# Patient Record
Sex: FEMALE | Race: WHITE | NOT HISPANIC OR LATINO | URBAN - METROPOLITAN AREA
[De-identification: names, ages, dates, MRNs, and addresses within clinical notes are randomized per-mention and may not be internally consistent; named-entity substitution may affect disease eponyms.]

---

## 2017-05-17 ENCOUNTER — TRANSCRIBE ORDERS (OUTPATIENT)
Dept: ADMINISTRATIVE | Facility: HOSPITAL | Age: 42
End: 2017-05-17

## 2017-05-17 DIAGNOSIS — Z12.31 VISIT FOR SCREENING MAMMOGRAM: Primary | ICD-10-CM

## 2017-05-24 ENCOUNTER — TRANSCRIBE ORDERS (OUTPATIENT)
Dept: LAB | Facility: HOSPITAL | Age: 42
End: 2017-05-24

## 2017-05-24 ENCOUNTER — APPOINTMENT (OUTPATIENT)
Dept: LAB | Facility: HOSPITAL | Age: 42
End: 2017-05-24

## 2017-05-24 ENCOUNTER — HOSPITAL ENCOUNTER (OUTPATIENT)
Dept: MAMMOGRAPHY | Facility: HOSPITAL | Age: 42
Discharge: HOME | End: 2017-05-24
Attending: OBSTETRICS & GYNECOLOGY | Admitting: OBSTETRICS & GYNECOLOGY

## 2017-05-24 DIAGNOSIS — Z12.31 VISIT FOR SCREENING MAMMOGRAM: ICD-10-CM

## 2017-05-24 DIAGNOSIS — N95.1 SYMPTOMATIC MENOPAUSAL OR FEMALE CLIMACTERIC STATES: Primary | ICD-10-CM

## 2017-05-24 DIAGNOSIS — R53.83 OTHER FATIGUE: ICD-10-CM

## 2017-05-24 LAB
25(OH)D3 SERPL-MCNC: 47.9 NG/ML
ESTRADIOL SERPL HS-MCNC: 86 PG/ML
T3RU NFR SERPL: 31.2 % (ref 23–37)
T4 SERPL-MCNC: 5.8 MCG/DL (ref 4.7–11.4)
TSH SERPL DL<=0.05 MIU/L-ACNC: 0.83 MIU/ML (ref 0.35–5.35)

## 2017-05-24 PROCEDURE — 84402 ASSAY OF FREE TESTOSTERONE: CPT | Performed by: OBSTETRICS & GYNECOLOGY

## 2017-05-24 PROCEDURE — 84436 ASSAY OF TOTAL THYROXINE: CPT | Performed by: OBSTETRICS & GYNECOLOGY

## 2017-05-24 PROCEDURE — 77063 BREAST TOMOSYNTHESIS BI: CPT | Performed by: RADIOLOGY

## 2017-05-24 PROCEDURE — 82306 VITAMIN D 25 HYDROXY: CPT | Performed by: OBSTETRICS & GYNECOLOGY

## 2017-05-24 PROCEDURE — 84479 ASSAY OF THYROID (T3 OR T4): CPT | Performed by: OBSTETRICS & GYNECOLOGY

## 2017-05-24 PROCEDURE — 84443 ASSAY THYROID STIM HORMONE: CPT | Performed by: OBSTETRICS & GYNECOLOGY

## 2017-05-24 PROCEDURE — 36415 COLL VENOUS BLD VENIPUNCTURE: CPT | Performed by: OBSTETRICS & GYNECOLOGY

## 2017-05-24 PROCEDURE — G0202 SCR MAMMO BI INCL CAD: HCPCS

## 2017-05-24 PROCEDURE — 77067 SCR MAMMO BI INCL CAD: CPT | Performed by: RADIOLOGY

## 2017-05-24 PROCEDURE — 77063 BREAST TOMOSYNTHESIS BI: CPT

## 2017-05-24 PROCEDURE — 82670 ASSAY OF TOTAL ESTRADIOL: CPT | Performed by: OBSTETRICS & GYNECOLOGY

## 2017-05-26 LAB — TESTOST FREE SERPL-MCNC: 2.9 PG/ML (ref 0–4.2)

## 2017-08-16 ENCOUNTER — TRANSCRIBE ORDERS (OUTPATIENT)
Dept: LAB | Facility: HOSPITAL | Age: 42
End: 2017-08-16

## 2017-08-16 ENCOUNTER — APPOINTMENT (OUTPATIENT)
Dept: LAB | Facility: HOSPITAL | Age: 42
End: 2017-08-16

## 2017-08-16 DIAGNOSIS — N95.1 MENOPAUSAL STATE: Primary | ICD-10-CM

## 2017-08-16 LAB
25(OH)D3 SERPL-MCNC: 47.7 NG/ML
ESTRADIOL SERPL HS-MCNC: 78 PG/ML

## 2017-08-16 PROCEDURE — 82670 ASSAY OF TOTAL ESTRADIOL: CPT | Performed by: OBSTETRICS & GYNECOLOGY

## 2017-08-16 PROCEDURE — 82306 VITAMIN D 25 HYDROXY: CPT | Performed by: OBSTETRICS & GYNECOLOGY

## 2017-08-16 PROCEDURE — 84402 ASSAY OF FREE TESTOSTERONE: CPT | Performed by: OBSTETRICS & GYNECOLOGY

## 2017-08-16 PROCEDURE — 36415 COLL VENOUS BLD VENIPUNCTURE: CPT | Performed by: OBSTETRICS & GYNECOLOGY

## 2017-08-18 LAB — TESTOST FREE SERPL-MCNC: 1.3 PG/ML (ref 0–4.2)

## 2017-10-25 ENCOUNTER — APPOINTMENT (OUTPATIENT)
Dept: LAB | Facility: HOSPITAL | Age: 42
End: 2017-10-25

## 2017-10-25 ENCOUNTER — TRANSCRIBE ORDERS (OUTPATIENT)
Dept: LAB | Facility: HOSPITAL | Age: 42
End: 2017-10-25

## 2017-10-25 DIAGNOSIS — N95.1 MENOPAUSAL SYNDROME: Primary | ICD-10-CM

## 2017-10-25 LAB — ESTRADIOL SERPL HS-MCNC: 109 PG/ML

## 2017-10-25 PROCEDURE — 84402 ASSAY OF FREE TESTOSTERONE: CPT | Performed by: OBSTETRICS & GYNECOLOGY

## 2017-10-25 PROCEDURE — 82670 ASSAY OF TOTAL ESTRADIOL: CPT | Performed by: OBSTETRICS & GYNECOLOGY

## 2017-10-25 PROCEDURE — 36415 COLL VENOUS BLD VENIPUNCTURE: CPT | Performed by: OBSTETRICS & GYNECOLOGY

## 2017-10-26 LAB — TESTOST FREE SERPL-MCNC: 1.1 PG/ML (ref 0–4.2)

## 2018-08-15 ENCOUNTER — TRANSCRIBE ORDERS (OUTPATIENT)
Dept: ADMINISTRATIVE | Facility: HOSPITAL | Age: 43
End: 2018-08-15

## 2018-08-15 DIAGNOSIS — Z12.31 VISIT FOR SCREENING MAMMOGRAM: Primary | ICD-10-CM

## 2018-09-19 ENCOUNTER — APPOINTMENT (OUTPATIENT)
Dept: MAMMOGRAPHY | Facility: HOSPITAL | Age: 43
End: 2018-09-19
Attending: OBSTETRICS & GYNECOLOGY

## 2020-07-06 ENCOUNTER — HOSPITAL ENCOUNTER (EMERGENCY)
Facility: HOSPITAL | Age: 45
Discharge: HOME | End: 2020-07-06
Attending: EMERGENCY MEDICINE | Admitting: EMERGENCY MEDICINE

## 2020-07-06 ENCOUNTER — APPOINTMENT (OUTPATIENT)
Dept: CT IMAGING | Facility: HOSPITAL | Age: 45
End: 2020-07-06

## 2020-07-06 VITALS
BODY MASS INDEX: 29.23 KG/M2 | RESPIRATION RATE: 16 BRPM | OXYGEN SATURATION: 98 % | HEIGHT: 59 IN | WEIGHT: 145 LBS | HEART RATE: 51 BPM | TEMPERATURE: 97.7 F | DIASTOLIC BLOOD PRESSURE: 69 MMHG | SYSTOLIC BLOOD PRESSURE: 120 MMHG

## 2020-07-06 DIAGNOSIS — K29.70 ESOPHAGITIS WITH GASTRITIS: Primary | ICD-10-CM

## 2020-07-06 DIAGNOSIS — K20.90 ESOPHAGITIS WITH GASTRITIS: Primary | ICD-10-CM

## 2020-07-06 LAB
ALBUMIN SERPL-MCNC: 4.12 G/DL (ref 3.5–5.2)
ALBUMIN/GLOB SERPL: 1.4 G/DL
ALP SERPL-CCNC: 53 U/L (ref 39–117)
ALT SERPL W P-5'-P-CCNC: 19 U/L (ref 1–33)
AMYLASE SERPL-CCNC: 67 U/L (ref 28–100)
ANION GAP SERPL CALCULATED.3IONS-SCNC: 12.4 MMOL/L (ref 5–15)
AST SERPL-CCNC: 20 U/L (ref 1–32)
BASOPHILS # BLD AUTO: 0.04 10*3/MM3 (ref 0–0.2)
BASOPHILS NFR BLD AUTO: 0.4 % (ref 0–1.5)
BILIRUB SERPL-MCNC: 0.4 MG/DL (ref 0.2–1.2)
BUN SERPL-MCNC: 14 MG/DL (ref 6–20)
BUN/CREAT SERPL: 17.7 (ref 7–25)
CALCIUM SPEC-SCNC: 8.9 MG/DL (ref 8.6–10.5)
CHLORIDE SERPL-SCNC: 104 MMOL/L (ref 98–107)
CO2 SERPL-SCNC: 23.6 MMOL/L (ref 22–29)
CREAT SERPL-MCNC: 0.79 MG/DL (ref 0.57–1)
DEPRECATED RDW RBC AUTO: 45.6 FL (ref 37–54)
EOSINOPHIL # BLD AUTO: 0.22 10*3/MM3 (ref 0–0.4)
EOSINOPHIL NFR BLD AUTO: 2 % (ref 0.3–6.2)
ERYTHROCYTE [DISTWIDTH] IN BLOOD BY AUTOMATED COUNT: 13.1 % (ref 12.3–15.4)
GFR SERPL CREATININE-BSD FRML MDRD: 79 ML/MIN/1.73
GLOBULIN UR ELPH-MCNC: 3 GM/DL
GLUCOSE SERPL-MCNC: 111 MG/DL (ref 65–99)
HCT VFR BLD AUTO: 38.6 % (ref 34–46.6)
HGB BLD-MCNC: 12 G/DL (ref 12–15.9)
IMM GRANULOCYTES # BLD AUTO: 0.02 10*3/MM3 (ref 0–0.05)
IMM GRANULOCYTES NFR BLD AUTO: 0.2 % (ref 0–0.5)
LIPASE SERPL-CCNC: 32 U/L (ref 13–60)
LYMPHOCYTES # BLD AUTO: 1.86 10*3/MM3 (ref 0.7–3.1)
LYMPHOCYTES NFR BLD AUTO: 16.9 % (ref 19.6–45.3)
MCH RBC QN AUTO: 29.5 PG (ref 26.6–33)
MCHC RBC AUTO-ENTMCNC: 31.1 G/DL (ref 31.5–35.7)
MCV RBC AUTO: 94.8 FL (ref 79–97)
MONOCYTES # BLD AUTO: 0.65 10*3/MM3 (ref 0.1–0.9)
MONOCYTES NFR BLD AUTO: 5.9 % (ref 5–12)
NEUTROPHILS NFR BLD AUTO: 74.6 % (ref 42.7–76)
NEUTROPHILS NFR BLD AUTO: 8.2 10*3/MM3 (ref 1.7–7)
NRBC BLD AUTO-RTO: 0 /100 WBC (ref 0–0.2)
PLATELET # BLD AUTO: 282 10*3/MM3 (ref 140–450)
PMV BLD AUTO: 10.3 FL (ref 6–12)
POTASSIUM SERPL-SCNC: 4.5 MMOL/L (ref 3.5–5.2)
PROT SERPL-MCNC: 7.1 G/DL (ref 6–8.5)
RBC # BLD AUTO: 4.07 10*6/MM3 (ref 3.77–5.28)
SODIUM SERPL-SCNC: 140 MMOL/L (ref 136–145)
TROPONIN T SERPL-MCNC: <0.01 NG/ML (ref 0–0.03)
WBC # BLD AUTO: 10.99 10*3/MM3 (ref 3.4–10.8)

## 2020-07-06 PROCEDURE — 96374 THER/PROPH/DIAG INJ IV PUSH: CPT

## 2020-07-06 PROCEDURE — 85025 COMPLETE CBC W/AUTO DIFF WBC: CPT | Performed by: EMERGENCY MEDICINE

## 2020-07-06 PROCEDURE — 99284 EMERGENCY DEPT VISIT MOD MDM: CPT

## 2020-07-06 PROCEDURE — 25010000002 MORPHINE PER 10 MG: Performed by: EMERGENCY MEDICINE

## 2020-07-06 PROCEDURE — 82150 ASSAY OF AMYLASE: CPT | Performed by: EMERGENCY MEDICINE

## 2020-07-06 PROCEDURE — 74176 CT ABD & PELVIS W/O CONTRAST: CPT

## 2020-07-06 PROCEDURE — 83690 ASSAY OF LIPASE: CPT | Performed by: EMERGENCY MEDICINE

## 2020-07-06 PROCEDURE — 93010 ELECTROCARDIOGRAM REPORT: CPT | Performed by: SPECIALIST

## 2020-07-06 PROCEDURE — 80053 COMPREHEN METABOLIC PANEL: CPT | Performed by: EMERGENCY MEDICINE

## 2020-07-06 PROCEDURE — 93005 ELECTROCARDIOGRAM TRACING: CPT | Performed by: EMERGENCY MEDICINE

## 2020-07-06 PROCEDURE — 96375 TX/PRO/DX INJ NEW DRUG ADDON: CPT

## 2020-07-06 PROCEDURE — 84484 ASSAY OF TROPONIN QUANT: CPT | Performed by: EMERGENCY MEDICINE

## 2020-07-06 PROCEDURE — 25010000002 ONDANSETRON PER 1 MG: Performed by: EMERGENCY MEDICINE

## 2020-07-06 RX ORDER — PANTOPRAZOLE SODIUM 40 MG/1
40 TABLET, DELAYED RELEASE ORAL DAILY
Qty: 30 TABLET | Refills: 0 | Status: SHIPPED | OUTPATIENT
Start: 2020-07-06

## 2020-07-06 RX ORDER — SUCRALFATE ORAL 1 G/10ML
1 SUSPENSION ORAL
Qty: 280 ML | Refills: 0 | Status: SHIPPED | OUTPATIENT
Start: 2020-07-06 | End: 2020-07-13

## 2020-07-06 RX ORDER — LIDOCAINE HYDROCHLORIDE 20 MG/ML
15 SOLUTION OROPHARYNGEAL ONCE
Status: COMPLETED | OUTPATIENT
Start: 2020-07-06 | End: 2020-07-06

## 2020-07-06 RX ORDER — ALUMINA, MAGNESIA, AND SIMETHICONE 2400; 2400; 240 MG/30ML; MG/30ML; MG/30ML
15 SUSPENSION ORAL ONCE
Status: COMPLETED | OUTPATIENT
Start: 2020-07-06 | End: 2020-07-06

## 2020-07-06 RX ORDER — ONDANSETRON 2 MG/ML
4 INJECTION INTRAMUSCULAR; INTRAVENOUS ONCE
Status: COMPLETED | OUTPATIENT
Start: 2020-07-06 | End: 2020-07-06

## 2020-07-06 RX ORDER — SODIUM CHLORIDE 0.9 % (FLUSH) 0.9 %
10 SYRINGE (ML) INJECTION AS NEEDED
Status: DISCONTINUED | OUTPATIENT
Start: 2020-07-06 | End: 2020-07-06 | Stop reason: HOSPADM

## 2020-07-06 RX ORDER — PHENOBARBITAL, HYOSCYAMINE SULFATE, ATROPINE SULFATE AND SCOPOLAMINE HYDROBROMIDE .0194; .1037; 16.2; .0065 MG/1; MG/1; MG/1; MG/1
2 TABLET ORAL ONCE
Status: COMPLETED | OUTPATIENT
Start: 2020-07-06 | End: 2020-07-06

## 2020-07-06 RX ADMIN — ONDANSETRON 4 MG: 2 INJECTION INTRAMUSCULAR; INTRAVENOUS at 04:47

## 2020-07-06 RX ADMIN — SODIUM CHLORIDE 1000 ML: 9 INJECTION, SOLUTION INTRAVENOUS at 04:47

## 2020-07-06 RX ADMIN — LIDOCAINE HYDROCHLORIDE 15 ML: 20 SOLUTION ORAL; TOPICAL at 06:27

## 2020-07-06 RX ADMIN — MORPHINE SULFATE 4 MG: 4 INJECTION, SOLUTION INTRAMUSCULAR; INTRAVENOUS at 04:47

## 2020-07-06 RX ADMIN — PHENOBARBITAL, HYOSCYAMINE SULFATE, ATROPINE SULFATE, SCOPOLAMINE HYDROBROMIDE 32.4 MG: 16.2; .1037; .0194; .0065 TABLET ORAL at 06:27

## 2020-07-06 RX ADMIN — ALUMINUM HYDROXIDE, MAGNESIUM HYDROXIDE, AND DIMETHICONE 15 ML: 400; 400; 40 SUSPENSION ORAL at 06:26

## 2020-07-06 NOTE — ED NOTES
Pt resting quietly on stretcher with complaints of epigastric tenderness.  Pt AAOx4 with no resp distress noted, respirations even and unlabored.  Pt denies any needs at this time.  Skin PWD.  Pt family at bedside. Will continue to monitor and follow plan of care.  Bed rails up x2, bed in lowest position, call light in reach.           Remedios Rubio, RN  07/06/20 0503

## 2020-07-08 NOTE — ED PROVIDER NOTES
Subjective   45-year-old female in the emergency department with nausea and vomiting.  Patient also reports epigastric pain.  No significant past medical history.      History provided by:  Patient   used: No        Review of Systems   Constitutional: Negative for activity change, appetite change, chills, diaphoresis, fatigue and fever.   HENT: Negative for congestion, ear pain and sore throat.    Eyes: Negative for redness.   Respiratory: Negative for cough, chest tightness, shortness of breath and wheezing.    Cardiovascular: Negative for chest pain, palpitations and leg swelling.   Gastrointestinal: Positive for abdominal pain, nausea and vomiting. Negative for diarrhea.   Genitourinary: Negative for dysuria and urgency.   Musculoskeletal: Negative for arthralgias, back pain, myalgias and neck pain.   Skin: Negative for pallor, rash and wound.   Neurological: Negative for dizziness, speech difficulty, weakness and headaches.   Psychiatric/Behavioral: Negative for agitation, behavioral problems, confusion and decreased concentration.   All other systems reviewed and are negative.      History reviewed. No pertinent past medical history.    Allergies   Allergen Reactions   • Codeine Nausea And Vomiting       Past Surgical History:   Procedure Laterality Date   • HYSTERECTOMY  2004    Total w/BSO   • OOPHORECTOMY Bilateral 2004       Family History   Problem Relation Age of Onset   • Breast cancer Neg Hx    • Ovarian cancer Neg Hx        Social History     Socioeconomic History   • Marital status:      Spouse name: Not on file   • Number of children: Not on file   • Years of education: Not on file   • Highest education level: Not on file           Objective   Physical Exam   Constitutional: She is oriented to person, place, and time. She appears well-developed and well-nourished.  Non-toxic appearance. No distress.   HENT:   Head: Normocephalic and atraumatic.   Right Ear: External ear  normal.   Left Ear: External ear normal.   Nose: Nose normal.   Mouth/Throat: Oropharynx is clear and moist and mucous membranes are normal. No oropharyngeal exudate. No tonsillar exudate.   Eyes: Pupils are equal, round, and reactive to light. Conjunctivae, EOM and lids are normal.   Neck: Normal range of motion and full passive range of motion without pain. Neck supple. No thyromegaly present.   Cardiovascular: Normal rate, regular rhythm, S1 normal, S2 normal, normal heart sounds, intact distal pulses and normal pulses.   Pulmonary/Chest: Effort normal and breath sounds normal. No tachypnea. No respiratory distress. She has no decreased breath sounds. She has no wheezes. She has no rales. She exhibits no tenderness.   Abdominal: Soft. Normal appearance and bowel sounds are normal. She exhibits no distension. There is tenderness in the epigastric area. There is no rebound and no guarding.   Musculoskeletal: Normal range of motion. She exhibits no edema, tenderness or deformity.   Lymphadenopathy:     She has no cervical adenopathy.   Neurological: She is alert and oriented to person, place, and time. She has normal strength. No cranial nerve deficit or sensory deficit. GCS eye subscore is 4. GCS verbal subscore is 5. GCS motor subscore is 6.   Skin: Skin is warm, dry and intact. No rash noted. She is not diaphoretic. No erythema. No pallor.   Psychiatric: She has a normal mood and affect. Her speech is normal and behavior is normal. Judgment and thought content normal. Cognition and memory are normal.   Nursing note and vitals reviewed.      Procedures           ED Course  ED Course as of Jul 08 0649   Mon Jul 06, 2020   0558 IMPRESSION:  Negative CT of the abdomen and pelvis.   CT Abdomen Pelvis Without Contrast [ES]   Wed Jul 08, 2020   0647 Vent. Rate : 055 BPM     Atrial Rate : 055 BPM     P-R Int : 138 ms          QRS Dur : 082 ms      QT Int : 452 ms       P-R-T Axes : 038 057 032 degrees     QTc Int : 432  ms     Sinus bradycardia with sinus arrhythmia  Otherwise normal ECG  No previous ECGs available   ECG 12 Lead [ES]      ED Course User Index  [ES] Yinka Kelly MD                                           MDM  Number of Diagnoses or Management Options  Esophagitis with gastritis: new and requires workup     Amount and/or Complexity of Data Reviewed  Clinical lab tests: reviewed and ordered  Tests in the radiology section of CPT®: reviewed and ordered  Tests in the medicine section of CPT®: reviewed and ordered  Review and summarize past medical records: yes  Independent visualization of images, tracings, or specimens: yes    Risk of Complications, Morbidity, and/or Mortality  Presenting problems: moderate  Diagnostic procedures: moderate  Management options: moderate    Patient Progress  Patient progress: stable      Final diagnoses:   Esophagitis with gastritis            Yinka Kelly MD  07/08/20 0682

## 2021-03-23 ENCOUNTER — BULK ORDERING (OUTPATIENT)
Dept: CASE MANAGEMENT | Facility: OTHER | Age: 46
End: 2021-03-23

## 2021-03-23 DIAGNOSIS — Z23 IMMUNIZATION DUE: ICD-10-CM

## 2022-05-06 ENCOUNTER — PRE-ADMISSION TESTING (OUTPATIENT)
Dept: PREADMISSION TESTING | Facility: HOSPITAL | Age: 47
End: 2022-05-06

## 2022-05-06 VITALS — HEIGHT: 59 IN | BODY MASS INDEX: 32.98 KG/M2 | WEIGHT: 163.58 LBS

## 2022-05-06 LAB
DEPRECATED RDW RBC AUTO: 44.2 FL (ref 37–54)
ERYTHROCYTE [DISTWIDTH] IN BLOOD BY AUTOMATED COUNT: 12.8 % (ref 12.3–15.4)
HBA1C MFR BLD: 5 % (ref 4.8–5.6)
HCT VFR BLD AUTO: 36.2 % (ref 34–46.6)
HGB BLD-MCNC: 11.9 G/DL (ref 12–15.9)
MCH RBC QN AUTO: 30.9 PG (ref 26.6–33)
MCHC RBC AUTO-ENTMCNC: 32.9 G/DL (ref 31.5–35.7)
MCV RBC AUTO: 94 FL (ref 79–97)
PLATELET # BLD AUTO: 333 10*3/MM3 (ref 140–450)
PMV BLD AUTO: 10.2 FL (ref 6–12)
RBC # BLD AUTO: 3.85 10*6/MM3 (ref 3.77–5.28)
SARS-COV-2 RNA PNL SPEC NAA+PROBE: NOT DETECTED
WBC NRBC COR # BLD: 9.02 10*3/MM3 (ref 3.4–10.8)

## 2022-05-06 PROCEDURE — 36415 COLL VENOUS BLD VENIPUNCTURE: CPT

## 2022-05-06 PROCEDURE — U0004 COV-19 TEST NON-CDC HGH THRU: HCPCS

## 2022-05-06 PROCEDURE — C9803 HOPD COVID-19 SPEC COLLECT: HCPCS

## 2022-05-06 PROCEDURE — 83036 HEMOGLOBIN GLYCOSYLATED A1C: CPT

## 2022-05-06 PROCEDURE — 85027 COMPLETE CBC AUTOMATED: CPT

## 2022-05-06 NOTE — PAT
Patient viewed general PAT education video as instructed in their preoperative information received from their surgeon.  Patient stated the general PAT education video was viewed in its entirety and survey completed.  Copies of [facility] general education handouts (Incentive Spirometry, Meds to Beds Program, Patient Belongings, Pre-op skin preparation instructions, Blood Glucose testing, Visitor policy, Surgery FAQ, Code H) distributed to patient if not printed. Education related to the PAT pass and skin preparation for surgery (if applicable) completed in PAT as a reinforcement to PAT education video. Patient instructed to return PAT pass provided today as well as completed skin preparation sheet (if applicable) on the day of procedure.     Additionally if patient had not viewed video yet but intended to view it at home or in our waiting area, then referred them to the handout with QR code/link provided during PAT visit.  Instructed patient to complete survey after viewing the video in its entirety.  Encouraged patient/family to read [facility] general education handouts thoroughly and notify PAT staff with any questions or concerns. Patient verbalized understanding of all information and priority content.    Patient to apply Chlorhexadine wipes  to surgical area (as instructed) the night before procedure and the AM of procedure. Wipes provided.    Patient denies any current skin issues.     COVID TEST PERFORMED IN PAT TODAY    An arrival time for procedure was not given during PAT visit. If patient had any questions or concerns about their arrival time, they were instructed to contact their surgeon/physician.  Additionally, if the patient referred to an arrival time that was acquired from their my chart account, patient was encouraged to verify that time with their surgeon/physician.  NO arrival times given in Pre Admission Testing Department.

## 2022-05-08 ENCOUNTER — ANESTHESIA EVENT (OUTPATIENT)
Dept: PERIOP | Facility: HOSPITAL | Age: 47
End: 2022-05-08

## 2022-05-08 NOTE — ANESTHESIA PREPROCEDURE EVALUATION
Anesthesia Evaluation     Patient summary reviewed and Nursing notes reviewed   history of anesthetic complications: PONV  NPO Solid Status: > 8 hours  NPO Liquid Status: > 8 hours           Airway   Mallampati: II  TM distance: >3 FB  Neck ROM: full  No difficulty expected  Dental - normal exam     Pulmonary - negative pulmonary ROS and normal exam   Cardiovascular - normal exam    ECG reviewed    (+) valvular problems/murmurs MVP,       Neuro/Psych  (+) headaches,    (-) seizures, TIA  GI/Hepatic/Renal/Endo    (+)  GERD well controlled,    (-) liver disease, no renal disease, diabetes, no thyroid disorder    Musculoskeletal     Abdominal    Substance History      OB/GYN          Other - negative ROS                     Anesthesia Plan    ASA 3     general   (Scop patch  Low dose propofol gtt for anti-nausea effect  TAPS)  intravenous induction     Anesthetic plan, all risks, benefits, and alternatives have been provided, discussed and informed consent has been obtained with: patient.    Plan discussed with CRNA.        CODE STATUS:

## 2022-05-09 ENCOUNTER — ANESTHESIA EVENT CONVERTED (OUTPATIENT)
Dept: ANESTHESIOLOGY | Facility: HOSPITAL | Age: 47
End: 2022-05-09

## 2022-05-09 ENCOUNTER — ANESTHESIA (OUTPATIENT)
Dept: PERIOP | Facility: HOSPITAL | Age: 47
End: 2022-05-09

## 2022-05-09 ENCOUNTER — HOSPITAL ENCOUNTER (OUTPATIENT)
Facility: HOSPITAL | Age: 47
Discharge: HOME | End: 2022-05-10
Attending: PLASTIC SURGERY | Admitting: PLASTIC SURGERY

## 2022-05-09 DIAGNOSIS — N64.81 BREAST PTOSIS: ICD-10-CM

## 2022-05-09 PROBLEM — L98.7 EXCESS SKIN: Status: ACTIVE | Noted: 2022-05-09

## 2022-05-09 PROCEDURE — 25010000002 HYDROMORPHONE 1 MG/ML SOLUTION

## 2022-05-09 PROCEDURE — 25010000002 MORPHINE PER 10 MG: Performed by: PLASTIC SURGERY

## 2022-05-09 PROCEDURE — G0378 HOSPITAL OBSERVATION PER HR: HCPCS

## 2022-05-09 PROCEDURE — 25010000002 EPINEPHRINE PER 0.1 MG: Performed by: PLASTIC SURGERY

## 2022-05-09 PROCEDURE — 25010000002 CEFAZOLIN IN DEXTROSE 2-4 GM/100ML-% SOLUTION: Performed by: PLASTIC SURGERY

## 2022-05-09 PROCEDURE — 25010000002 FENTANYL CITRATE (PF) 50 MCG/ML SOLUTION

## 2022-05-09 PROCEDURE — 25010000002 ONDANSETRON PER 1 MG

## 2022-05-09 PROCEDURE — 25010000002 ONDANSETRON PER 1 MG: Performed by: NURSE ANESTHETIST, CERTIFIED REGISTERED

## 2022-05-09 PROCEDURE — 25010000002 HEPARIN (PORCINE) PER 1000 UNITS: Performed by: PLASTIC SURGERY

## 2022-05-09 PROCEDURE — 88302 TISSUE EXAM BY PATHOLOGIST: CPT | Performed by: PLASTIC SURGERY

## 2022-05-09 PROCEDURE — 25010000002 DEXAMETHASONE SODIUM PHOSPHATE 10 MG/ML SOLUTION: Performed by: NURSE ANESTHETIST, CERTIFIED REGISTERED

## 2022-05-09 DEVICE — DEV CONTRL TISS STRATAFIX SYMM PDS PLUS VIL CT-1 45CM: Type: IMPLANTABLE DEVICE | Site: ABDOMEN | Status: FUNCTIONAL

## 2022-05-09 DEVICE — DEV CONTRL TISS STRATAFIX SPIRAL MNCRYL UD 3/0 PLS 60CM: Type: IMPLANTABLE DEVICE | Site: ABDOMEN | Status: FUNCTIONAL

## 2022-05-09 RX ORDER — ROCURONIUM BROMIDE 10 MG/ML
INJECTION, SOLUTION INTRAVENOUS AS NEEDED
Status: DISCONTINUED | OUTPATIENT
Start: 2022-05-09 | End: 2022-05-09 | Stop reason: SURG

## 2022-05-09 RX ORDER — MAGNESIUM HYDROXIDE 1200 MG/15ML
LIQUID ORAL AS NEEDED
Status: DISCONTINUED | OUTPATIENT
Start: 2022-05-09 | End: 2022-05-09 | Stop reason: HOSPADM

## 2022-05-09 RX ORDER — MORPHINE SULFATE 2 MG/ML
2 INJECTION, SOLUTION INTRAMUSCULAR; INTRAVENOUS
Status: DISCONTINUED | OUTPATIENT
Start: 2022-05-09 | End: 2022-05-10 | Stop reason: HOSPADM

## 2022-05-09 RX ORDER — MELOXICAM 7.5 MG/1
15 TABLET ORAL DAILY
Status: DISCONTINUED | OUTPATIENT
Start: 2022-05-09 | End: 2022-05-10 | Stop reason: HOSPADM

## 2022-05-09 RX ORDER — FENTANYL CITRATE 50 UG/ML
INJECTION, SOLUTION INTRAMUSCULAR; INTRAVENOUS
Status: COMPLETED
Start: 2022-05-09 | End: 2022-05-09

## 2022-05-09 RX ORDER — ONDANSETRON 2 MG/ML
4 INJECTION INTRAMUSCULAR; INTRAVENOUS EVERY 6 HOURS PRN
Status: DISCONTINUED | OUTPATIENT
Start: 2022-05-09 | End: 2022-05-10 | Stop reason: HOSPADM

## 2022-05-09 RX ORDER — BUPIVACAINE HYDROCHLORIDE 2.5 MG/ML
INJECTION, SOLUTION EPIDURAL; INFILTRATION; INTRACAUDAL
Status: COMPLETED | OUTPATIENT
Start: 2022-05-09 | End: 2022-05-09

## 2022-05-09 RX ORDER — FAMOTIDINE 10 MG/ML
20 INJECTION, SOLUTION INTRAVENOUS ONCE
Status: COMPLETED | OUTPATIENT
Start: 2022-05-09 | End: 2022-05-09

## 2022-05-09 RX ORDER — DEXAMETHASONE SODIUM PHOSPHATE 10 MG/ML
INJECTION, SOLUTION INTRAMUSCULAR; INTRAVENOUS
Status: COMPLETED | OUTPATIENT
Start: 2022-05-09 | End: 2022-05-09

## 2022-05-09 RX ORDER — SODIUM CHLORIDE 0.9 % (FLUSH) 0.9 %
10 SYRINGE (ML) INJECTION EVERY 12 HOURS SCHEDULED
Status: DISCONTINUED | OUTPATIENT
Start: 2022-05-09 | End: 2022-05-09 | Stop reason: HOSPADM

## 2022-05-09 RX ORDER — CALCIUM CARBONATE 200(500)MG
1 TABLET,CHEWABLE ORAL DAILY PRN
Status: DISCONTINUED | OUTPATIENT
Start: 2022-05-09 | End: 2022-05-10 | Stop reason: HOSPADM

## 2022-05-09 RX ORDER — ESMOLOL HYDROCHLORIDE 10 MG/ML
INJECTION INTRAVENOUS AS NEEDED
Status: DISCONTINUED | OUTPATIENT
Start: 2022-05-09 | End: 2022-05-09 | Stop reason: SURG

## 2022-05-09 RX ORDER — NALOXONE HCL 0.4 MG/ML
0.4 VIAL (ML) INJECTION
Status: DISCONTINUED | OUTPATIENT
Start: 2022-05-09 | End: 2022-05-10 | Stop reason: HOSPADM

## 2022-05-09 RX ORDER — FAMOTIDINE 20 MG/1
20 TABLET, FILM COATED ORAL ONCE
Status: COMPLETED | OUTPATIENT
Start: 2022-05-09 | End: 2022-05-09

## 2022-05-09 RX ORDER — HEPARIN SODIUM 5000 [USP'U]/ML
5000 INJECTION, SOLUTION INTRAVENOUS; SUBCUTANEOUS ONCE
Status: COMPLETED | OUTPATIENT
Start: 2022-05-09 | End: 2022-05-09

## 2022-05-09 RX ORDER — SODIUM CHLORIDE 9 MG/ML
75 INJECTION, SOLUTION INTRAVENOUS CONTINUOUS
Status: DISCONTINUED | OUTPATIENT
Start: 2022-05-09 | End: 2022-05-10 | Stop reason: HOSPADM

## 2022-05-09 RX ORDER — CEFAZOLIN SODIUM 2 G/100ML
2 INJECTION, SOLUTION INTRAVENOUS EVERY 8 HOURS
Status: DISCONTINUED | OUTPATIENT
Start: 2022-05-09 | End: 2022-05-10 | Stop reason: HOSPADM

## 2022-05-09 RX ORDER — CEFAZOLIN SODIUM 2 G/100ML
2 INJECTION, SOLUTION INTRAVENOUS ONCE
Status: COMPLETED | OUTPATIENT
Start: 2022-05-09 | End: 2022-05-09

## 2022-05-09 RX ORDER — DIPHENHYDRAMINE HYDROCHLORIDE 50 MG/ML
12.5 INJECTION INTRAMUSCULAR; INTRAVENOUS EVERY 6 HOURS PRN
Status: DISCONTINUED | OUTPATIENT
Start: 2022-05-09 | End: 2022-05-10 | Stop reason: HOSPADM

## 2022-05-09 RX ORDER — FENTANYL CITRATE 50 UG/ML
INJECTION, SOLUTION INTRAMUSCULAR; INTRAVENOUS
Status: DISPENSED
Start: 2022-05-09 | End: 2022-05-10

## 2022-05-09 RX ORDER — SODIUM CHLORIDE, SODIUM LACTATE, POTASSIUM CHLORIDE, CALCIUM CHLORIDE 600; 310; 30; 20 MG/100ML; MG/100ML; MG/100ML; MG/100ML
9 INJECTION, SOLUTION INTRAVENOUS CONTINUOUS
Status: DISCONTINUED | OUTPATIENT
Start: 2022-05-09 | End: 2022-05-10 | Stop reason: HOSPADM

## 2022-05-09 RX ORDER — LIDOCAINE HYDROCHLORIDE 10 MG/ML
0.5 INJECTION, SOLUTION EPIDURAL; INFILTRATION; INTRACAUDAL; PERINEURAL ONCE AS NEEDED
Status: COMPLETED | OUTPATIENT
Start: 2022-05-09 | End: 2022-05-09

## 2022-05-09 RX ORDER — OXYCODONE HYDROCHLORIDE 5 MG/1
5 TABLET ORAL EVERY 4 HOURS PRN
Status: DISCONTINUED | OUTPATIENT
Start: 2022-05-09 | End: 2022-05-10 | Stop reason: HOSPADM

## 2022-05-09 RX ORDER — ONDANSETRON 2 MG/ML
INJECTION INTRAMUSCULAR; INTRAVENOUS
Status: COMPLETED
Start: 2022-05-09 | End: 2022-05-09

## 2022-05-09 RX ORDER — HYDROMORPHONE HYDROCHLORIDE 1 MG/ML
0.5 INJECTION, SOLUTION INTRAMUSCULAR; INTRAVENOUS; SUBCUTANEOUS
Status: DISCONTINUED | OUTPATIENT
Start: 2022-05-09 | End: 2022-05-09 | Stop reason: HOSPADM

## 2022-05-09 RX ORDER — SODIUM CHLORIDE 0.9 % (FLUSH) 0.9 %
10 SYRINGE (ML) INJECTION AS NEEDED
Status: DISCONTINUED | OUTPATIENT
Start: 2022-05-09 | End: 2022-05-09 | Stop reason: HOSPADM

## 2022-05-09 RX ORDER — EPHEDRINE SULFATE 50 MG/ML
INJECTION, SOLUTION INTRAVENOUS AS NEEDED
Status: DISCONTINUED | OUTPATIENT
Start: 2022-05-09 | End: 2022-05-09 | Stop reason: SURG

## 2022-05-09 RX ORDER — ACETAMINOPHEN 500 MG
1000 TABLET ORAL EVERY 6 HOURS
Status: DISCONTINUED | OUTPATIENT
Start: 2022-05-09 | End: 2022-05-10 | Stop reason: HOSPADM

## 2022-05-09 RX ORDER — FENTANYL CITRATE 50 UG/ML
50 INJECTION, SOLUTION INTRAMUSCULAR; INTRAVENOUS
Status: DISCONTINUED | OUTPATIENT
Start: 2022-05-09 | End: 2022-05-09 | Stop reason: HOSPADM

## 2022-05-09 RX ORDER — LABETALOL HYDROCHLORIDE 5 MG/ML
INJECTION, SOLUTION INTRAVENOUS AS NEEDED
Status: DISCONTINUED | OUTPATIENT
Start: 2022-05-09 | End: 2022-05-09 | Stop reason: SURG

## 2022-05-09 RX ORDER — FAMOTIDINE 20 MG/1
20 TABLET, FILM COATED ORAL
Status: DISCONTINUED | OUTPATIENT
Start: 2022-05-10 | End: 2022-05-10 | Stop reason: HOSPADM

## 2022-05-09 RX ORDER — PROMETHAZINE HYDROCHLORIDE 12.5 MG/1
6.25 TABLET ORAL EVERY 6 HOURS PRN
Status: DISCONTINUED | OUTPATIENT
Start: 2022-05-09 | End: 2022-05-10 | Stop reason: HOSPADM

## 2022-05-09 RX ORDER — GLYCOPYRROLATE 0.2 MG/ML
INJECTION INTRAMUSCULAR; INTRAVENOUS AS NEEDED
Status: DISCONTINUED | OUTPATIENT
Start: 2022-05-09 | End: 2022-05-09 | Stop reason: SURG

## 2022-05-09 RX ORDER — SCOLOPAMINE TRANSDERMAL SYSTEM 1 MG/1
1 PATCH, EXTENDED RELEASE TRANSDERMAL ONCE
Status: DISCONTINUED | OUTPATIENT
Start: 2022-05-09 | End: 2022-05-10 | Stop reason: HOSPADM

## 2022-05-09 RX ORDER — CYCLOBENZAPRINE HCL 10 MG
10 TABLET ORAL EVERY 8 HOURS PRN
Status: DISCONTINUED | OUTPATIENT
Start: 2022-05-09 | End: 2022-05-10 | Stop reason: HOSPADM

## 2022-05-09 RX ORDER — PROMETHAZINE HYDROCHLORIDE 12.5 MG/1
6.25 SUPPOSITORY RECTAL EVERY 6 HOURS PRN
Status: DISCONTINUED | OUTPATIENT
Start: 2022-05-09 | End: 2022-05-10 | Stop reason: HOSPADM

## 2022-05-09 RX ORDER — MIDAZOLAM HYDROCHLORIDE 1 MG/ML
1 INJECTION INTRAMUSCULAR; INTRAVENOUS
Status: DISCONTINUED | OUTPATIENT
Start: 2022-05-09 | End: 2022-05-09 | Stop reason: HOSPADM

## 2022-05-09 RX ORDER — ONDANSETRON 2 MG/ML
INJECTION INTRAMUSCULAR; INTRAVENOUS AS NEEDED
Status: DISCONTINUED | OUTPATIENT
Start: 2022-05-09 | End: 2022-05-09 | Stop reason: SURG

## 2022-05-09 RX ADMIN — FENTANYL CITRATE 50 MCG: 0.05 INJECTION, SOLUTION INTRAMUSCULAR; INTRAVENOUS at 14:22

## 2022-05-09 RX ADMIN — FENTANYL CITRATE 50 MCG: 0.05 INJECTION, SOLUTION INTRAMUSCULAR; INTRAVENOUS at 13:25

## 2022-05-09 RX ADMIN — EPHEDRINE SULFATE 20 MG: 50 INJECTION INTRAVENOUS at 08:30

## 2022-05-09 RX ADMIN — HYDROMORPHONE HYDROCHLORIDE 0.5 MG: 1 INJECTION, SOLUTION INTRAMUSCULAR; INTRAVENOUS; SUBCUTANEOUS at 13:57

## 2022-05-09 RX ADMIN — SODIUM CHLORIDE, POTASSIUM CHLORIDE, SODIUM LACTATE AND CALCIUM CHLORIDE 500 ML: 600; 310; 30; 20 INJECTION, SOLUTION INTRAVENOUS at 19:46

## 2022-05-09 RX ADMIN — MELOXICAM 15 MG: 7.5 TABLET ORAL at 16:01

## 2022-05-09 RX ADMIN — EPHEDRINE SULFATE 10 MG: 50 INJECTION INTRAVENOUS at 10:26

## 2022-05-09 RX ADMIN — MORPHINE SULFATE 2 MG: 2 INJECTION, SOLUTION INTRAMUSCULAR; INTRAVENOUS at 16:01

## 2022-05-09 RX ADMIN — ROCURONIUM BROMIDE 10 MG: 10 INJECTION, SOLUTION INTRAVENOUS at 11:05

## 2022-05-09 RX ADMIN — ONDANSETRON 4 MG: 2 INJECTION INTRAMUSCULAR; INTRAVENOUS at 15:15

## 2022-05-09 RX ADMIN — SODIUM CHLORIDE 50 ML/HR: 9 INJECTION, SOLUTION INTRAVENOUS at 16:01

## 2022-05-09 RX ADMIN — FENTANYL CITRATE 50 MCG: 50 INJECTION, SOLUTION INTRAMUSCULAR; INTRAVENOUS at 14:22

## 2022-05-09 RX ADMIN — CEFAZOLIN SODIUM 2 G: 2 INJECTION, SOLUTION INTRAVENOUS at 11:44

## 2022-05-09 RX ADMIN — EPHEDRINE SULFATE 10 MG: 50 INJECTION INTRAVENOUS at 08:25

## 2022-05-09 RX ADMIN — SCOPALAMINE 1 PATCH: 1 PATCH, EXTENDED RELEASE TRANSDERMAL at 07:06

## 2022-05-09 RX ADMIN — GLYCOPYRROLATE 0.2 MG: 0.2 INJECTION INTRAMUSCULAR; INTRAVENOUS at 08:26

## 2022-05-09 RX ADMIN — ACETAMINOPHEN 1000 MG: 500 TABLET ORAL at 16:01

## 2022-05-09 RX ADMIN — LABETALOL HYDROCHLORIDE 10 MG: 5 INJECTION, SOLUTION INTRAVENOUS at 08:52

## 2022-05-09 RX ADMIN — EPHEDRINE SULFATE 10 MG: 50 INJECTION INTRAVENOUS at 12:37

## 2022-05-09 RX ADMIN — FENTANYL CITRATE 50 MCG: 50 INJECTION, SOLUTION INTRAMUSCULAR; INTRAVENOUS at 13:25

## 2022-05-09 RX ADMIN — OXYCODONE 5 MG: 5 TABLET ORAL at 19:53

## 2022-05-09 RX ADMIN — HYDROMORPHONE HYDROCHLORIDE 0.5 MG: 1 INJECTION, SOLUTION INTRAMUSCULAR; INTRAVENOUS; SUBCUTANEOUS at 13:40

## 2022-05-09 RX ADMIN — DEXAMETHASONE SODIUM PHOSPHATE 2 MG: 10 INJECTION, SOLUTION INTRAMUSCULAR; INTRAVENOUS at 07:07

## 2022-05-09 RX ADMIN — FAMOTIDINE 20 MG: 10 INJECTION, SOLUTION INTRAVENOUS at 07:07

## 2022-05-09 RX ADMIN — LIDOCAINE HYDROCHLORIDE 0.5 ML: 10 INJECTION, SOLUTION EPIDURAL; INFILTRATION; INTRACAUDAL; PERINEURAL at 07:05

## 2022-05-09 RX ADMIN — ONDANSETRON 4 MG: 2 INJECTION INTRAMUSCULAR; INTRAVENOUS at 11:56

## 2022-05-09 RX ADMIN — ESMOLOL HYDROCHLORIDE 10 MG: 10 INJECTION, SOLUTION INTRAVENOUS at 08:31

## 2022-05-09 RX ADMIN — CEFAZOLIN SODIUM 2 G: 2 INJECTION, SOLUTION INTRAVENOUS at 07:44

## 2022-05-09 RX ADMIN — ROCURONIUM BROMIDE 10 MG: 10 INJECTION, SOLUTION INTRAVENOUS at 08:36

## 2022-05-09 RX ADMIN — SODIUM CHLORIDE, POTASSIUM CHLORIDE, SODIUM LACTATE AND CALCIUM CHLORIDE 9 ML/HR: 600; 310; 30; 20 INJECTION, SOLUTION INTRAVENOUS at 07:05

## 2022-05-09 RX ADMIN — CYCLOBENZAPRINE 10 MG: 10 TABLET, FILM COATED ORAL at 16:02

## 2022-05-09 RX ADMIN — ACETAMINOPHEN 1000 MG: 500 TABLET ORAL at 22:30

## 2022-05-09 RX ADMIN — HEPARIN SODIUM 5000 UNITS: 5000 INJECTION, SOLUTION INTRAVENOUS; SUBCUTANEOUS at 07:05

## 2022-05-09 RX ADMIN — BUPIVACAINE HYDROCHLORIDE 50 ML: 2.5 INJECTION, SOLUTION EPIDURAL; INFILTRATION; INTRACAUDAL at 07:07

## 2022-05-09 RX ADMIN — FAMOTIDINE 20 MG: 20 TABLET ORAL at 06:49

## 2022-05-09 RX ADMIN — CEFAZOLIN SODIUM 2 G: 2 INJECTION, SOLUTION INTRAVENOUS at 20:40

## 2022-05-09 RX ADMIN — ANTACID TABLETS 1 TABLET: 500 TABLET, CHEWABLE ORAL at 22:33

## 2022-05-09 RX ADMIN — SUGAMMADEX 200 MG: 100 INJECTION, SOLUTION INTRAVENOUS at 12:48

## 2022-05-09 RX ADMIN — ROCURONIUM BROMIDE 10 MG: 10 INJECTION, SOLUTION INTRAVENOUS at 09:44

## 2022-05-09 NOTE — ANESTHESIA PROCEDURE NOTES
4 QUAD TAPS       Patient reassessed immediately prior to procedure    Patient location during procedure: OR  Reason for block: at surgeon's request and post-op pain management  Performed by  CRNA/CAA: Monet Tipton CRNA  Preanesthetic Checklist  Completed: patient identified, IV checked, site marked, risks and benefits discussed, surgical consent, monitors and equipment checked, pre-op evaluation and timeout performed  Prep:  Pt Position: supine  Sterile barriers:cap, gloves, sterile barriers and mask  Prep: ChloraPrep  Patient monitoring: blood pressure monitoring, continuous pulse oximetry and EKG  Procedure    Sedation: yes  Performed under: general  Guidance:ultrasound guided  Images:still images obtained, printed/placed on chart    Laterality:Bilateral  Block Type:TAP  Injection Technique:single-shot  Needle Type:short-bevel and echogenic  Needle Gauge:20 G  Resistance on Injection: none    Medications Used: bupivacaine PF (MARCAINE) 0.25 % injection, 50 mL  dexamethasone sodium phosphate injection, 2 mg      Medications  Comment:Block Injection:  LA dose divided between Right and Left block        Post Assessment  Injection Assessment: negative aspiration for heme, incremental injection and no paresthesia on injection  Patient Tolerance:comfortable throughout block  Complications:no  Additional Notes      Under Ultrasound guidance, a BBraun 4inch 360 degree needle was advanced with Normal Saline hydro dissection of tissue.  The Internal Oblique and Transversus Abdominus muscles where visualized.  At or before the aponeurosis of Internal Oblique, local anesthetic spread was visualized in the Transversus Abdominus Plane. Injection was made incrementally with aspiration every 5 mls.  There was no  intravascular injection,  injection pressure was normal, there was no neural injection, and the procedure was completed without difficulty.  Thank You.

## 2022-05-09 NOTE — H&P
Pre-Op H&P  Ellen Vela  [id number]  1975    Chief complaint: Cosmetic procedure       Chief Complaint: excess skin and fat    HPI:  Ellen Vela is a 46yoF  nonsmoker hx of MVP with multiple complaints. Patient states she would like her thighs circumferentially liposuctioned. Patient states no lower extremity trauma or surgery. Patient also would like her abdomen contoured ideally flatter. Patient states no other abdominal surgery besides the hysterectomy. She states no hernia. Patient states at her goal weight. Patient states also like a breast lift no implants. Patient states no family history of breast cancer recent mammogram benign. Patient states no other breast surgery. Patient hx of MVP saw a cardiologist recently and was cleared. Patient states no blood thinners. Patient was seen in clinic and found appropriate for mastopexy, abdominoplasty and liposuction.     PMH: GERD, MVP,  PSH: Hysterectomy, Hernia Repair Bladder surgery  SH: Denies smoking, drinking, no illicit drug abuse. Lives in [place]  FH: No family history of blood clots or bleeding disorders  Allergies: latex, codeine  Medications: No blood thinner or steroids,    Review of Systems:  General ROS: negative for chills, fever or skin lesions;  No changes since last office visit.  Neg for recent sick exposure  Cardiovascular ROS: no chest pain or dyspnea on exertion  Respiratory ROS: no cough, shortness of breath, or wheezing    Allergies:   Allergies   Allergen Reactions   • Latex Rash   • Codeine Nausea And Vomiting       Home Meds:    No current facility-administered medications on file prior to encounter.     Current Outpatient Medications on File Prior to Encounter   Medication Sig Dispense Refill   • pantoprazole (PROTONIX) 40 MG EC tablet Take 1 tablet by mouth Daily. 30 tablet 0       PMH:   Past Medical History:   Diagnosis Date   • Constipation    • GERD (gastroesophageal reflux disease)    • PONV (postoperative nausea  "and vomiting)    • Spinal headache    • Wears glasses      PSH:    Past Surgical History:   Procedure Laterality Date   • COLONOSCOPY     • HYSTERECTOMY  2004    Total w/BSO   • OOPHORECTOMY Bilateral 2004   • VAGINAL DELIVERY         Immunization History:  Influenza: NO  Pneumococcal: NO  Tetanus: NO  COVID: YES    Social History:   Tobacco:   Social History     Tobacco Use   Smoking Status Never Smoker   Smokeless Tobacco Never Used      Alcohol:     Social History     Substance and Sexual Activity   Alcohol Use Yes    Comment: SOCIAL, VERY OCC       Vitals:           /80 (BP Location: Right arm, Patient Position: Lying)   Pulse 72   Temp 97.4 °F (36.3 °C) (Temporal)   Resp 17   Ht 149.9 cm (59\")   Wt 73.9 kg (163 lb)   SpO2 99%   BMI 32.92 kg/m²     Physical Exam:  General Appearance:    Alert, cooperative, no distress, appears stated age   Head:    Normocephalic, without obvious abnormality, atraumatic   Lungs:     Clear to auscultation bilaterally, respirations unlabored    Heart:   Regular rate and rhythm, S1 and S2 normal, no murmur, rub    or gallop    Abdomen:    Soft, nontender.  +bowel sounds   Breast Exam:    deferred   Genitalia:    deferred   Extremities:   Extremities normal, atraumatic, no cyanosis or edema   Skin:   Skin color, texture, turgor normal, no rashes or lesions   Neurologic:   Grossly intact   Results Review  LABS:  Lab Results   Component Value Date    WBC 9.02 05/06/2022    HGB 11.9 (L) 05/06/2022    HCT 36.2 05/06/2022    MCV 94.0 05/06/2022     05/06/2022    NEUTROABS 8.20 (H) 07/06/2020    GLUCOSE 111 (H) 07/06/2020    BUN 14 07/06/2020    CREATININE 0.79 07/06/2020    EGFRIFNONA 79 07/06/2020     07/06/2020    K 4.5 07/06/2020     07/06/2020    CO2 23.6 07/06/2020    CALCIUM 8.9 07/06/2020    ALBUMIN 4.12 07/06/2020    AST 20 07/06/2020    ALT 19 07/06/2020    BILITOT 0.4 07/06/2020       RADIOLOGY:  No radiology results for the last 3 days     I " reviewed the patient's new clinical results.    Cancer Staging (if applicable)  Cancer Patient: __ yes __no __unknown; If yes, clinical stage T:__ N:__M:__, stage group or __N/A    Assessment: 46yoF  nonsmoker hx of MVP with bilateral breast ptosis, abdominal rectus diastasis with excess skin and thigh excess fats.    Plan:  Breast ptosis  - Discussed mastopexy. Recommend staged mastopexy followed by later augmentation. Patient has decided on mastopexy  - discussed the risks of mastopexy  including complications (scars, tissue loss, wound healing issues particularly at the T junction of the incision, hematoma, loss or change in nipple sensation, inability to breast feed, nipple loss, infection, pulmonary embolism, death).  - Mammogram recent benign 11/10/21 no new mammogram needed    Abdominal rectus diastasis and excess fat  - Discussed staged liposuction followed by abdominoplasty vs extended abdominoplasty vs lower body lift. Recommend staged liposuction followed by abdominoplasty to address the central abdominal fat staged. Patient does not want to do staged surgery. Recommend at this point extended abdominoplasty with flank liposuction.  - discussed the risk and benefits of the procedure including complications (scars, tissue loss, wound healing issues, hematoma, infection, pulmonary embolism, death), risk of belly button loss, belly button malposition, remaining central abdominal fat, decreased sensation    Thighs excess fat  - based on PE and goals. Recommend liposuction to circumferentially focusing lateral and inner thighs  - Discussed risks including hematoma seroma, infection, contour irregularities, asymmetry.      MVP  - Patient obtained cardiac clearance from a cardiologist, note in the charge      - to OR for bilateral mastopexy, extended abdominoplasty with rectus plication, and liposuction to back, flanks, thighs.         WENDY Matthews   22   6:45 AM EDT

## 2022-05-09 NOTE — BRIEF OP NOTE
MASTOPEXY, ABDOMINOPLASTY, LIPOSUCTION FLANK HIP THIGH  Progress Note    Ellen Vela  5/9/2022    Pre-op Diagnosis:   Excess abdominal skin and fat       Post-Op Diagnosis Codes:  Excess abdominal skin and fat    Procedure/CPT® Codes:  1) liposuction of back, lateral flank chest and abdomen, bilateral inner thighs  2) bilateral mastopexy  3) bilateral abdominoplasty       Procedure(s):  MASTOPEXY  ABDOMINOPLASTY W/ LIPOSUCTION  LIPOSUCTION BACK AND THIGHS    Surgeon(s):  Juliocesar Suero MD Moore, Evan M, MD    Anesthesia: General    Staff:   Circulator: Dakota Houston, BUCK; Isha Alvarado, BUCK; Blanca Hardy, BUCK  Physician Assistant: Annabelle Frederick PA  Scrub Person: Isha Schuler      Estimated Blood Loss: 300 cc    Urine Voided: 400 mL    Specimens:                Right breast tissue: 40 grams  Left breast tissue: 48 grams      Drains:    Closed/Suction Drain 1 Medial;Anterior Abdomen Bulb 15 Fr. (Active)       Urethral Catheter Non-latex;Straight-tip;Silicone;Double-lumen 16 Fr. (Active)       Complications: none immediate          Tom Stout MD     Date: 5/9/2022  Time: 13:21 EDT

## 2022-05-09 NOTE — PLAN OF CARE
Goal Outcome Evaluation:   Pt noted drainage to lower back incisions post lipo as well as bilateral lower extremities - to be expected per md. Drainage sites cleansed, pressure applied, bp monitored, reassurance provided. VSS at this time. MD visited 2x post procedure. Fc dcd per order, pt attempted to void 1x without success. Nursing to continue to monitor, 0 acute concerns at this time. Tw rn         Progress: improving

## 2022-05-09 NOTE — ANESTHESIA POSTPROCEDURE EVALUATION
"Patient: Ellen Vela    Procedure Summary     Date: 05/09/22 [facility] / [facility]    Anesthesia Start: 0734 Anesthesia Stop:     Procedures:       MASTOPEXY (Bilateral Breast)      ABDOMINOPLASTY W/ LIPOSUCTION (N/A Abdomen)      LIPOSUCTION BACK AND THIGHS (Bilateral Hip) Diagnosis:     Surgeons: Tom Stout MD Provider: Lisa Marshall MD    Anesthesia Type: general ASA Status: 3          Anesthesia Type: general    Vitals  No vitals data found for the desired time range.          Post Anesthesia Care and Evaluation    Patient location during evaluation: PACU  Patient participation: complete - patient participated  Level of consciousness: awake and responsive to verbal stimuli  Pain score: 2  Pain management: adequate  Airway patency: patent  Anesthetic complications: No anesthetic complications    Cardiovascular status: acceptable  Respiratory status: acceptable  Hydration status: acceptable    Comments: Pt awake and responsive. SV. VSS. Report to RN. Patient Vitals in the past 24 hrs:  05/09/22 0639, BP:124/80, Temp:97.4 °F (36.3 °C), Temp src:Temporal, Pulse:72, Resp:17, SpO2:99 %, Height:149.9 cm (59\"), Weight:73.9 kg (163 lb)  133/78. p 72. r 16. t 98.1                  "

## 2022-05-10 VITALS
HEIGHT: 59 IN | BODY MASS INDEX: 32.86 KG/M2 | HEART RATE: 105 BPM | RESPIRATION RATE: 17 BRPM | OXYGEN SATURATION: 94 % | TEMPERATURE: 98.8 F | SYSTOLIC BLOOD PRESSURE: 104 MMHG | WEIGHT: 163 LBS | DIASTOLIC BLOOD PRESSURE: 66 MMHG

## 2022-05-10 LAB
CYTO UR: NORMAL
LAB AP CASE REPORT: NORMAL
LAB AP CLINICAL INFORMATION: NORMAL
PATH REPORT.FINAL DX SPEC: NORMAL
PATH REPORT.GROSS SPEC: NORMAL

## 2022-05-10 PROCEDURE — 25010000002 CEFAZOLIN IN DEXTROSE 2-4 GM/100ML-% SOLUTION: Performed by: PLASTIC SURGERY

## 2022-05-10 PROCEDURE — 25010000002 ENOXAPARIN PER 10 MG: Performed by: PLASTIC SURGERY

## 2022-05-10 PROCEDURE — 25010000002 ONDANSETRON PER 1 MG: Performed by: PLASTIC SURGERY

## 2022-05-10 PROCEDURE — 25010000002 MORPHINE PER 10 MG: Performed by: PLASTIC SURGERY

## 2022-05-10 RX ORDER — ENOXAPARIN SODIUM 100 MG/ML
40 INJECTION SUBCUTANEOUS ONCE
Status: COMPLETED | OUTPATIENT
Start: 2022-05-10 | End: 2022-05-10

## 2022-05-10 RX ADMIN — ENOXAPARIN SODIUM 40 MG: 40 INJECTION SUBCUTANEOUS at 08:13

## 2022-05-10 RX ADMIN — MORPHINE SULFATE 2 MG: 2 INJECTION, SOLUTION INTRAMUSCULAR; INTRAVENOUS at 10:09

## 2022-05-10 RX ADMIN — FAMOTIDINE 20 MG: 20 TABLET ORAL at 08:13

## 2022-05-10 RX ADMIN — MELOXICAM 15 MG: 7.5 TABLET ORAL at 08:13

## 2022-05-10 RX ADMIN — MORPHINE SULFATE 2 MG: 2 INJECTION, SOLUTION INTRAMUSCULAR; INTRAVENOUS at 00:44

## 2022-05-10 RX ADMIN — ACETAMINOPHEN 1000 MG: 500 TABLET ORAL at 12:00

## 2022-05-10 RX ADMIN — ACETAMINOPHEN 1000 MG: 500 TABLET ORAL at 04:39

## 2022-05-10 RX ADMIN — CEFAZOLIN SODIUM 2 G: 2 INJECTION, SOLUTION INTRAVENOUS at 04:38

## 2022-05-10 RX ADMIN — MORPHINE SULFATE 2 MG: 2 INJECTION, SOLUTION INTRAMUSCULAR; INTRAVENOUS at 12:00

## 2022-05-10 RX ADMIN — ONDANSETRON 4 MG: 2 INJECTION INTRAMUSCULAR; INTRAVENOUS at 00:13

## 2022-05-10 RX ADMIN — CEFAZOLIN SODIUM 2 G: 2 INJECTION, SOLUTION INTRAVENOUS at 12:00

## 2022-05-10 NOTE — DISCHARGE INSTRUCTIONS
no acute surgery - did well overnight, ambulating, voiding, pain controlled.  - may shower no baths  - switch to compression dressing  - scripts at home  - no heavy lifting  - walk hunched over  - sleep at an incline  - lovenox shot today show start arixtra starting tomorrow 5/11/22  - drain care and drain teaching  - follow up with Dr. Tom Stout as scheduled 5/17/22

## 2022-05-10 NOTE — OP NOTE
Patient Name:  Ellen Vela  :  1975  MRN:  [id number]    Date of Surgery:  2022    Pre Operative Diagnosis: excess abdominal skin and excess fat   Post Operative Diagnosis: excess abdominal skin and excess fat     PROCEDURES PERFORMED:  1) liposuction of back, lateral flank chest and abdomen, bilateral inner thighs  2) bilateral mastopexy superior pedicle vertical pattern  3) bilateral abdominoplasty     SURGEON: Tom Stout MD    Staff:  Surgeon(s):  Juliocesar Suero MD Moore, Evan M, MD    Circulator: Dakota Houston, BUCK; Isha Alvarado, BUCK; Blanca Hardy RN  Physician Assistant: Annabelle Frederick PA  Scrub Person: Isha Schuler     Anesthesia: General    Indications for the Procedure:  46yoF  nonsmoker hx of MVP with bilateral breast ptosis, abdominal rectus diastasis with excess skin and thigh excess fats.    Operative Findings:  Bilateral grade II breast ptosis, excess abdominal skin with rectus diastasis.     Description of the Procedure:     Patient was seen in preop risks and benefits were discussed with the patient who elected to proceed with the procedure. H&P was updated, consent was obtained, and patient was marked appropriately. Patient was then taken back to the OR placed in a supine position. Anesthesia was induced and the patient was then intubated. The patient was then placed in a prone position. The patient was the prepped and draped in a sterile fashion. A time out was performed in which the patient and the procedure were gone over. Everyone was in agreement in terms of the patient and the procedure.   Starting with the liposuction component. A marker was use to make out the poke holes along the back and posterior thigh followed by local. A 15 blade was used to make the poke holes. Tumescent solution was placed 1 liter of tumescent in the back and flanks total. 600 cc of tumescent solution was placed the inner thighs. A 4.6 mm cannula was used to perform separation  followed by aspiration of fat. 1200 cc of liposuction was performed on the back and bilateral flanks. Posterior thigh liposuction 300 cc on each inner thigh for a total of 600 cc. Fat equalization was performed with a 4.6 mm cannula. The poke holes were closed with 5-0 nylon. Drapes were taken down and the patient was placed into a supine position. Patient was then prepped and draped again in a sterile like fashion. Further thigh liposuction was performed anteriorly of the bilateral inner thighs. A marker again was used to make the poke holes followed by local. A 15 blade was used to make the holes. A 4.6 mm cannula was then used to perform anterior inner thigh separation followed by aspiration of fat. 300 cc of liposuction was performed on each inner thigh for a total 600 cc anteriorly.     Attention then went to the mastopexy. Bilateral breasts were tailored tacked into position using stapler and two pickups vertical pattern was marked. The patient was setup and nipple position was marked and measured for symmetry. A 42 mm cookie cutter was used to chris the areola. The patient was then layed back down.      Starting with the right breast. The marker was used to outline the pattern followed by stapler removal and remarking the planned pattern.   A 45 mm areola marker was then used to measure the areola to be used. A 15 blade was then used to score the areola marker. The tailored tacked pattern and pedicle was then scored with a 15 blade. A 10 blade was then used to de epithelize the superior pedicle. The bovie cautery was then used to outline the pedicle. Once the pedicle was defined. The inferior breast tissue was excised. The breast tissue was then placed on the back table to be measured. The breast was then irrigated, and hemostasis was obtained. The flaps were then brought together with a 2-0 vicryl suture. The breast was then tailored tacked into position.      Attention then went to the left breast.  The  marker was used to outline the pattern followed by stapler removal and remarking the planned pattern.   A 45 mm areola marker was then used to measure the areola to be used. A 15 blade was then used to score the areola marker. The tailored tacked pattern and pedicle was then scored with a 15 blade. A 10 blade was then used to de epithelize the superior pedicle. The bovie cautery was then used to outline the pedicle. Once the pedicle was defined. The inferior breast tissue was excised. The breast tissue was then placed on the back table to be measured. The breast was then irrigated, and hemostasis was obtained. The flaps were then brought together with a 2-0 vicryl suture. The breast was then tailored tacked into position.      Patient was then setup noting symmetry and areolas/nipples were re measured for confirm symmetry. The nipples were placed into position. The breast at this point were then closed. The areola was closed with 3-0 monocryl deep dermal stitches followed by a running 4-0 monocryl. The vertical limb was closed with deep pillar 2-0 vicryl stitches followed by deep dermal 3-0 monocryl stitches and a running 4-0 subcuticular stitch.     A marker was used to draw out the planned lower abdominal incision 6 cm superior to the vaginal vestibule. A ruler was used to check symmetry. Local was then placed along the incision. A 10 blade was used to score the incision followed by bovie cautery down to the rectus fascia. Dissection continued in a superior direction up to the umbilicus. At the umbilicus a marker was used to chris out the planned umbilicus. A 11 blade was used to outline the umbilicus. Tenotomy scissors were used to better define the umbilicus. A 10-blade followed by bovie cautery was used to split the flap. Dissection continued around the umbilicus and up centrally to the xiphoid. The abdomen was irrigated, and hemostasis noted. The abdominal plication was marked with a marker. 0 ethibond figure  of 8 stitches were used to plicate followed by 1-0 strattafix suture. The mons was then tacked into position with 2-0 vicryl suture. The patient was then placed in beach chair flexed position. Progressive tension sutures were then placed with 2-0 vicryl. One drain was then placed and the drain was sutured into position with 3-0 nylon. The flaps were brought down and marked for excision. Local was placed along the incision. The planned incision was scored using a 10 blade followed by bovie cautery. The skin flaps were then excised. Hemostasis was noted. The skin flaps were then tailored tacked into position using a stapler. The incision was closed with deep 2-0 vicryl suture followed by In-sorb stapler and a running 3-0 strattafix suture. A marker was used prior to closure to chris the new umbilicus position. A 15 blade was used to make an ellipse incision where the new umbilicus has been marked. Tenotomy scissor were then used to dissect out the umbilicus. The umbilicus was pulled into position. 3-0 monocryls were then used to tack the umbilicus into position followed by 5-0 monocryl suture.      Xeofin was then placed along the incision. Dressings were then placed on the breast, abdomen and inner thighs.       At this point in time the procedure had finished. Patient was then extubated and taken to PACU for full recovery.       Implants:    Implant Name Type Inv. Item Serial No.  Lot No. LRB No. Used Action   DEV CONTRL TISS STRATAFIX SPIRAL MNCRYL UD 3/0 PLS 60CM - [id number] Implant DEV CONTRL TISS STRATAFIX SPIRAL MNCRYL UD 3/0 PLS 60CM  [employer]  DIV [employer] J [employer] J N/A  1 Implanted   DEV CONTRL TISS STRATAFIX SYMM PDS PLUS DARIO CT-1 45CM - [id number] Implant DEV CONTRL TISS STRATAFIX SYMM PDS PLUS DARIO CT-1 45CM  [employer]  [facility]  2 Implanted       Specimen Removed:   Right breast: 40 grams  Left breast: 48 grams        Estimated Blood Loss: 300 cc  Drains Placed:      Complications: none immediate      Tom Stout MD     Date: 5/9/2022  Time: 20:13 EDT

## 2022-05-10 NOTE — CASE MANAGEMENT/SOCIAL WORK
Continued Stay Note  [facility]     Patient Name: Ellen Vela  MRN: [id number]  Today's Date: 5/10/2022    Admit Date: 5/9/2022     Discharge Plan     Row Name 05/10/22 0906       Plan    Plan Home    Patient/Family in Agreement with Plan yes    Plan Comments Plan is home. No needs. Family to transport.    Final Discharge Disposition Code 01 - home or self-care               Discharge Codes    No documentation.                     Courtney Camarillo RN

## 2022-05-10 NOTE — PLAN OF CARE
Problem: Adult Inpatient Plan of Care  Goal: Optimal Comfort and Wellbeing  Intervention: Monitor Pain and Promote Comfort  Recent Flowsheet Documentation  Taken 5/10/2022 0044 by Harmony Leong RN  Pain Management Interventions: see MAR  Intervention: Provide Person-Centered Care  Recent Flowsheet Documentation  Taken 5/10/2022 0044 by Harmony Leong RN  Trust Relationship/Rapport:   care explained   choices provided   reassurance provided   thoughts/feelings acknowledged  Taken 5/10/2022 0010 by Harmony Leong, BUCK  Trust Relationship/Rapport:   care explained   choices provided   reassurance provided   thoughts/feelings acknowledged  Taken 5/9/2022 2230 by Harmony Leong, BUCK  Trust Relationship/Rapport:   care explained   choices provided   reassurance provided   thoughts/feelings acknowledged  Taken 5/9/2022 2000 by Harmony Leong RN  Trust Relationship/Rapport:   care explained   reassurance provided   thoughts/feelings acknowledged   Goal Outcome Evaluation:   Pt is A&OX4 with VSS and some c/o pain and discomfort as well as nausea (PRN's given as needed). She has ambulated to the bathroom with sufficient UOP. Dressings mx and saturated ABD pad to lumbar changed. Will CTM and provide care.

## 2022-05-10 NOTE — DISCHARGE SUMMARY
HPI  Ellen Vela is a 46yoF  nonsmoker hx of MVP with multiple complaints. Patient states she would like her thighs circumferentially liposuctioned. Patient states no lower extremity trauma or surgery. Patient also would like her abdomen contoured ideally flatter. Patient states no other abdominal surgery besides the hysterectomy. She states no hernia. Patient states at her goal weight. Patient states also like a breast lift no implants. Patient states no family history of breast cancer recent mammogram benign. Patient states no other breast surgery. Patient hx of MVP saw a cardiologist recently and was cleared. Patient states no blood thinners. Patient was seen in clinic and found appropriate for mastopexy, abdominoplasty and liposuction.     22  Procedure/CPT® Codes:  1) liposuction of back, lateral flank chest and abdomen, bilateral inner thighs  2) bilateral mastopexy  3) bilateral abdominoplasty     Hospital Course  Patient was planned admit overnight for observation for pain control and hydration. Patient post operatively was tachycardic low BP seen at bedside found to be dehydrated. Patient was given IVF bolus which patient responded. Patient was seen in AM found to have VSS afebrile, PE patient nipples pink and perfuse, breast soft, minimal ecchymosis from liposuction, abdomen soft incision intact drain in place. Patient was found appropriate for discharge.

## 2022-05-10 NOTE — PROGRESS NOTES
Plastic Surgery Progress Note      Admission Date:  2022  LOS:  0  Patient Care Team:  Felisha Mays DO as PCP - General (Family Medicine)  Tom Stout MD as Consulting Physician (Plastic Surgery)    Patient Name:  Ellen Vela  :  1975  MRN:  [id number]    Date:  5/10/2022      Subjective:    Patient did well overnight. Ambulating, voiding, pain controlled, and minimal drain output.     History:   Past Medical History:   Diagnosis Date   • Constipation    • GERD (gastroesophageal reflux disease)    • PONV (postoperative nausea and vomiting)    • Spinal headache    • Wears glasses      Past Surgical History:   Procedure Laterality Date   • COLONOSCOPY     • HYSTERECTOMY      Total w/BSO   • OOPHORECTOMY Bilateral    • VAGINAL DELIVERY       Family History   Problem Relation Age of Onset   • Breast cancer Neg Hx    • Ovarian cancer Neg Hx      Social History     Socioeconomic History   • Marital status:    Tobacco Use   • Smoking status: Never Smoker   • Smokeless tobacco: Never Used   Vaping Use   • Vaping Use: Never used   Substance and Sexual Activity   • Alcohol use: Yes     Comment: SOCIAL, VERY OCC   • Drug use: Never     Allergies   Allergen Reactions   • Latex Rash   • Codeine Nausea And Vomiting       Medication:    Current Facility-Administered Medications:   •  acetaminophen (TYLENOL) tablet 1,000 mg, 1,000 mg, Oral, Q6H, Tom Stout MD, 1,000 mg at 05/10/22 0439  •  calcium carbonate (TUMS) chewable tablet 500 mg (200 mg elemental), 1 tablet, Oral, Daily PRN, Tom Stout MD, 1 tablet at 22 2233  •  ceFAZolin in dextrose (ANCEF) IVPB solution 2 g, 2 g, Intravenous, Q8H, Tom Stout MD, Stopped at 05/10/22 0658  •  cyclobenzaprine (FLEXERIL) tablet 10 mg, 10 mg, Oral, Q8H PRN, Tom Stout MD, 10 mg at 22 1602  •  diphenhydrAMINE (BENADRYL) injection 12.5 mg, 12.5 mg, Intravenous, Q6H PRN, Tom Stout MD  •  famotidine (PEPCID) tablet  20 mg, 20 mg, Oral, BID AC, Tom Stout MD  •  lactated ringers infusion, 9 mL/hr, Intravenous, Continuous, Lisa Marshall MD, Stopped at 22 204  •  meloxicam (MOBIC) tablet 15 mg, 15 mg, Oral, Daily, Tom Stout MD, 15 mg at 22 1601  •  morphine injection 2 mg, 2 mg, Intravenous, Q2H PRN, 2 mg at 05/10/22 0044 **AND** naloxone (NARCAN) injection 0.4 mg, 0.4 mg, Intravenous, Q5 Min PRN, Tom Stout MD  •  ondansetron (ZOFRAN) injection 4 mg, 4 mg, Intravenous, Q6H PRN, Tom Stout MD, 4 mg at 05/10/22 0013  •  oxyCODONE (ROXICODONE) immediate release tablet 5 mg, 5 mg, Oral, Q4H PRN, Tom Stout MD, 5 mg at 22  •  promethazine (PHENERGAN) tablet 6.25 mg, 6.25 mg, Oral, Q6H PRN **OR** promethazine (PHENERGAN) suppository 6.25 mg, 6.25 mg, Rectal, Q6H PRN, Tom Stout MD  •  scopolamine patch 1 mg/72 hr, 1 patch, Transdermal, Once, Lisa Marshall MD, 1 patch at 22 0706  •  sodium chloride 0.9 % infusion, 75 mL/hr, Intravenous, Continuous, Tom Stout MD, Stopped at 05/10/22 0657    Antibiotics:  Anti-Infectives (From admission, onward)    Ordered     Dose/Rate Route Frequency Start Stop    22 1550  ceFAZolin in dextrose (ANCEF) IVPB solution 2 g        Ordering Provider: Tom Stout MD    2 g  over 30 Minutes Intravenous Every 8 Hours 22 19522 0646  ceFAZolin in dextrose (ANCEF) IVPB solution 2 g        Ordering Provider: Tom Stout MD    2 g  over 30 Minutes Intravenous Once 22 0648 22 1144            Objective:    Physical Exam:   Vital Signs:  Temp (24hrs), Av.4 °F (36.3 °C), Min:96 °F (35.6 °C), Max:98.9 °F (37.2 °C)    Temp  Min: 96 °F (35.6 °C)  Max: 98.9 °F (37.2 °C)  BP  Min: 96/61  Max: 116/69  Pulse  Min: 56  Max: 119  Resp  Min: 16  Max: 20  SpO2  Min: 91 %  Max: 100 %    GENERAL: Awake and alert, in no acute distress.   HEENT: Normocephalic, atraumatic.  EOMI. No conjunctival injection.  No icterus.   NECK: Supple without nuchal rigidity. No mass.  LYMPH: No cervical, axillary or inguinal lymphadenopathy.  HEART: RRR;   LUNGS: Clear to auscultation bilaterally, Normal respiratory effort. Nonlabored. No dullness.    ABDOMEN: Soft, nontender, nondistended. No rebound or guarding. NO mass or HSM. Umbilicus in place, no palpable hematoma or seroma, no ecchymosis, drain in place    Chest: nipples pink and perfused soft intact, symmetric in volume  Thighs: dressing in place, ecchymosis       Laboratory Data:  Results from last 7 days   Lab Units 22  1501   WBC 10*3/mm3 9.02   HEMOGLOBIN g/dL 11.9*   HEMATOCRIT % 36.2   PLATELETS 10*3/mm3 333                                 CrCl cannot be calculated (Patient's most recent lab result is older than the maximum 30 days allowed.).    Microbiology:  No results found for: ACANTHNAEG, AFBCX, BPERTUSSISCX, BLOODCX  No results found for: BCIDPCR, CXREFLEX, CSFCX, CULTURETIS  No results found for: CULTURES, HSVCX, URCX  No results found for: EYECULTURE, GCCX, HSVCULTURE, LABHSV  No results found for: LEGIONELLA, MRSACX, MUMPSCX, MYCOPLASCX  No results found for: NOCARDIACX, STOOLCX  No results found for: THROATCX, UNSTIMCULT, URINECX, CULTURE, VZVCULTUR  No results found for: VIRALCULTU, WOUNDCX      Assessment: 46yoF  nonsmoker hx of MVP with bilateral breast ptosis, abdominal rectus diastasis with excess skin and thigh excess fats s/p 22 liposuction of back, bilateral flanks, bilateral inner thighs, bilateral mastopexy and abdominoplasty.      Plan:  - no acute surgery - did well overnight, ambulating, voiding, pain controlled.  - may shower no baths  - switch to compression dressing  - scripts at home  - no heavy lifting  - walk hunched over  - sleep at an incline  - lovenox shot today show  - drain care and drain teaching  - follow up with Dr. Tom Stout as scheduled 22            Tom Stout MD  05/10/22  07:17 EDT

## 2022-05-10 NOTE — PLAN OF CARE
Goal Outcome Evaluation:   Pt dc home with  - avs and meds reviewd. Pt and  demonstrated understanding of teaching. Drain, incision and wound care reviewed with pt/. 0 acute concerns to report. Transport to pickup pt via wc. Pt eating/voiding well this shift. Will monitor pt until dc. Tw rn      Progress: improving

## 2024-06-04 NOTE — ANESTHESIA PROCEDURE NOTES
Airway  Urgency: elective    Date/Time: 5/9/2022 7:40 AM  Airway not difficult    General Information and Staff    Patient location during procedure: OR  CRNA/CAA: Woo Maldonado CRNA    Indications and Patient Condition  Indications for airway management: airway protection    Preoxygenated: yes  MILS not maintained throughout  Mask difficulty assessment: 1 - vent by mask    Final Airway Details  Final airway type: endotracheal airway      Successful airway: ETT  Cuffed: yes   Successful intubation technique: direct laryngoscopy  Endotracheal tube insertion site: oral  Blade: Margarita  Blade size: 3  ETT size (mm): 7.5  Cormack-Lehane Classification: grade I - full view of glottis  Placement verified by: chest auscultation and capnometry   Measured from: lips  ETT/EBT  to lips (cm): 20  Number of attempts at approach: 1  Assessment: lips, teeth, and gum same as pre-op and atraumatic intubation    Additional Comments  Negative epigastric sounds, Breath sound equal bilaterally with symmetric chest rise and fall             7

## 2024-08-27 ENCOUNTER — TRANSCRIBE ORDERS (OUTPATIENT)
Dept: ADMINISTRATIVE | Facility: HOSPITAL | Age: 49
End: 2024-08-27
Payer: COMMERCIAL

## 2024-08-27 DIAGNOSIS — R10.9 RIGHT FLANK PAIN: ICD-10-CM

## 2024-08-27 DIAGNOSIS — R31.9 HEMATURIA, UNSPECIFIED TYPE: Primary | ICD-10-CM

## 2024-09-04 ENCOUNTER — HOSPITAL ENCOUNTER (OUTPATIENT)
Dept: CT IMAGING | Facility: HOSPITAL | Age: 49
Discharge: HOME | End: 2024-09-04
Admitting: FAMILY MEDICINE
Payer: COMMERCIAL

## 2024-09-04 DIAGNOSIS — R10.9 RIGHT FLANK PAIN: ICD-10-CM

## 2024-09-04 DIAGNOSIS — R31.9 HEMATURIA, UNSPECIFIED TYPE: ICD-10-CM

## 2024-09-04 PROCEDURE — 74176 CT ABD & PELVIS W/O CONTRAST: CPT

## (undated) DEVICE — DRAIN JACKSON PRATT ROUND 15FR: Brand: CARDINAL HEALTH

## (undated) DEVICE — SUT MNCRYL PLS ANTIB UD 4/0 PS2 18IN

## (undated) DEVICE — Device

## (undated) DEVICE — BANDAGE,GAUZE,BULKEE II,4.5"X4.1YD,STRL: Brand: MEDLINE

## (undated) DEVICE — INTENDED FOR TISSUE SEPARATION, AND OTHER PROCEDURES THAT REQUIRE A SHARP SURGICAL BLADE TO PUNCTURE OR CUT.: Brand: BARD-PARKER ® STAINLESS STEEL BLADES

## (undated) DEVICE — CVR HNDL LIGHT RIGID

## (undated) DEVICE — TRAP FLD MINIVAC MEGADYNE 100ML

## (undated) DEVICE — TOTAL TRAY, 16FR 10ML SIL FOLEY, URN: Brand: MEDLINE

## (undated) DEVICE — TBG SXN LIPECTOMY 108IN

## (undated) DEVICE — SUT MONOCRYL PLS ANTIB UND 3/0  PS1 27IN

## (undated) DEVICE — LINER CANSTR SXN HERCULES

## (undated) DEVICE — PK CHST BRST 10

## (undated) DEVICE — JACKSON-PRATT 100CC BULB RESERVOIR: Brand: CARDINAL HEALTH

## (undated) DEVICE — PROXIMATE RH ROTATING HEAD SKIN STAPLERS (35 WIDE) CONTAINS 35 STAINLESS STEEL STAPLES: Brand: PROXIMATE

## (undated) DEVICE — SUT ETHLN 5/0 P3 18IN 698G

## (undated) DEVICE — 1010 S-DRAPE TOWEL DRAPE 10/BX: Brand: STERI-DRAPE™

## (undated) DEVICE — ANTIBACTERIAL UNDYED BRAIDED (POLYGLACTIN 910), SYNTHETIC ABSORBABLE SUTURE: Brand: COATED VICRYL

## (undated) DEVICE — SPNG GZ WOVN 4X4IN 12PLY 10/BX STRL

## (undated) DEVICE — BNDR ABD PREMIUM/UNIV 10IN 27TO48IN

## (undated) DEVICE — CLTH CLENS READYCLEANSE PERI CARE PK/5

## (undated) DEVICE — GLV SURG SENSICARE W/ALOE PF LF 7.5 STRL

## (undated) DEVICE — PATIENT RETURN ELECTRODE, SINGLE-USE, CONTACT QUALITY MONITORING, ADULT, WITH 9FT CORD, FOR PATIENTS WEIGING OVER 33LBS. (15KG): Brand: MEGADYNE

## (undated) DEVICE — BLANKT WARM PED/CHLD 130X92CM DISP LF

## (undated) DEVICE — SUT ETHIB 0/0 MO6 I8IN CX45D

## (undated) DEVICE — SPNG LAP PREWSH SFTPK 18X18IN STRL PK/5

## (undated) DEVICE — BLANKT WARM LOWR/BDY 100X120CM

## (undated) DEVICE — STPLR SKIN SUBCUTICULAR INSORB 2030

## (undated) DEVICE — SUT MNCRYL PLS ANTIB UD 3/0 PS2 27IN

## (undated) DEVICE — APPL CHLORAPREP TINTED 26ML TEAL

## (undated) DEVICE — ELECTRD BLD EZ CLN MOD XLNG 2.75IN

## (undated) DEVICE — BLAKE SILICONE DRAIN, 15 FR ROUND, HUBLESS WITH 3/16" TROCAR: Brand: BLAKE

## (undated) DEVICE — UNDERGLV SURG BIOGEL INDICAT PI SZ8 BLU

## (undated) DEVICE — SUT MNCRYL 5/0 P3 18IN UD MCP493G

## (undated) DEVICE — TBG PENCL TELESCP MEGADYNE SMOKE EVAC 10FT

## (undated) DEVICE — SUT ETHLN 3/0 PC5 18IN 1893G

## (undated) DEVICE — INTENDED USE FOR SURGICAL MARKING ON INTACT SKIN, ALSO PROVIDES A PERMANENT METHOD OF IDENTIFYING OBJECTS IN THE OPERATING ROOM: Brand: WRITESITE® REGULAR TIP SKIN MARKER

## (undated) DEVICE — DRSNG SURESITE WNDW 2.38X2.75

## (undated) DEVICE — ADHS SKIN PREMIERPRO EXOFIN TOPICAL HI/VISC .5ML